# Patient Record
Sex: MALE | Race: WHITE | NOT HISPANIC OR LATINO | URBAN - METROPOLITAN AREA
[De-identification: names, ages, dates, MRNs, and addresses within clinical notes are randomized per-mention and may not be internally consistent; named-entity substitution may affect disease eponyms.]

---

## 2023-07-12 DIAGNOSIS — R97.20 ELEVATED PROSTATE SPECIFIC ANTIGEN (PSA): Primary | ICD-10-CM

## 2023-08-08 ENCOUNTER — OFFICE VISIT (OUTPATIENT)
Dept: UROLOGY | Facility: CLINIC | Age: 73
End: 2023-08-08
Payer: MEDICARE

## 2023-08-08 VITALS
WEIGHT: 141.31 LBS | HEIGHT: 67 IN | SYSTOLIC BLOOD PRESSURE: 123 MMHG | TEMPERATURE: 98 F | HEART RATE: 68 BPM | BODY MASS INDEX: 22.18 KG/M2 | DIASTOLIC BLOOD PRESSURE: 69 MMHG | RESPIRATION RATE: 18 BRPM

## 2023-08-08 DIAGNOSIS — R97.20 ELEVATED PROSTATE SPECIFIC ANTIGEN (PSA): ICD-10-CM

## 2023-08-08 DIAGNOSIS — N40.2 PROSTATE NODULE: Primary | ICD-10-CM

## 2023-08-08 PROCEDURE — 3008F BODY MASS INDEX DOCD: CPT | Mod: CPTII,S$GLB,, | Performed by: UROLOGY

## 2023-08-08 PROCEDURE — 1159F MED LIST DOCD IN RCRD: CPT | Mod: CPTII,S$GLB,, | Performed by: UROLOGY

## 2023-08-08 PROCEDURE — 1101F PT FALLS ASSESS-DOCD LE1/YR: CPT | Mod: CPTII,S$GLB,, | Performed by: UROLOGY

## 2023-08-08 PROCEDURE — 3078F DIAST BP <80 MM HG: CPT | Mod: CPTII,S$GLB,, | Performed by: UROLOGY

## 2023-08-08 PROCEDURE — 3288F PR FALLS RISK ASSESSMENT DOCUMENTED: ICD-10-PCS | Mod: CPTII,S$GLB,, | Performed by: UROLOGY

## 2023-08-08 PROCEDURE — 3078F PR MOST RECENT DIASTOLIC BLOOD PRESSURE < 80 MM HG: ICD-10-PCS | Mod: CPTII,S$GLB,, | Performed by: UROLOGY

## 2023-08-08 PROCEDURE — 1159F PR MEDICATION LIST DOCUMENTED IN MEDICAL RECORD: ICD-10-PCS | Mod: CPTII,S$GLB,, | Performed by: UROLOGY

## 2023-08-08 PROCEDURE — 3008F PR BODY MASS INDEX (BMI) DOCUMENTED: ICD-10-PCS | Mod: CPTII,S$GLB,, | Performed by: UROLOGY

## 2023-08-08 PROCEDURE — 1126F PR PAIN SEVERITY QUANTIFIED, NO PAIN PRESENT: ICD-10-PCS | Mod: CPTII,S$GLB,, | Performed by: UROLOGY

## 2023-08-08 PROCEDURE — 1126F AMNT PAIN NOTED NONE PRSNT: CPT | Mod: CPTII,S$GLB,, | Performed by: UROLOGY

## 2023-08-08 PROCEDURE — 3074F PR MOST RECENT SYSTOLIC BLOOD PRESSURE < 130 MM HG: ICD-10-PCS | Mod: CPTII,S$GLB,, | Performed by: UROLOGY

## 2023-08-08 PROCEDURE — 99204 PR OFFICE/OUTPT VISIT, NEW, LEVL IV, 45-59 MIN: ICD-10-PCS | Mod: S$GLB,,, | Performed by: UROLOGY

## 2023-08-08 PROCEDURE — 3074F SYST BP LT 130 MM HG: CPT | Mod: CPTII,S$GLB,, | Performed by: UROLOGY

## 2023-08-08 PROCEDURE — 99204 OFFICE O/P NEW MOD 45 MIN: CPT | Mod: S$GLB,,, | Performed by: UROLOGY

## 2023-08-08 PROCEDURE — 99999 PR PBB SHADOW E&M-EST. PATIENT-LVL IV: CPT | Mod: PBBFAC,,, | Performed by: UROLOGY

## 2023-08-08 PROCEDURE — 1101F PR PT FALLS ASSESS DOC 0-1 FALLS W/OUT INJ PAST YR: ICD-10-PCS | Mod: CPTII,S$GLB,, | Performed by: UROLOGY

## 2023-08-08 PROCEDURE — 3288F FALL RISK ASSESSMENT DOCD: CPT | Mod: CPTII,S$GLB,, | Performed by: UROLOGY

## 2023-08-08 PROCEDURE — 99999 PR PBB SHADOW E&M-EST. PATIENT-LVL IV: ICD-10-PCS | Mod: PBBFAC,,, | Performed by: UROLOGY

## 2023-08-08 RX ORDER — LEVOFLOXACIN 500 MG/1
TABLET, FILM COATED ORAL
Qty: 1 TABLET | Refills: 0 | Status: SHIPPED | OUTPATIENT
Start: 2023-08-08

## 2023-08-08 RX ORDER — FLUTICASONE FUROATE, UMECLIDINIUM BROMIDE AND VILANTEROL TRIFENATATE 100; 62.5; 25 UG/1; UG/1; UG/1
1 POWDER RESPIRATORY (INHALATION)
COMMUNITY
Start: 2023-05-10

## 2023-08-08 NOTE — PROGRESS NOTES
Chief Complaint   Patient presents with    Other     Elevated PSA       Referring Provider: Dr. Dioni Langley     History of Present Illness:   Surekha Nguyen is a 72 y.o. male here for evaluation of Other (Elevated PSA)  .   23-73yo male, here for evaluation of elevated PSA on 10. on 23 at [facility]. This is up from last year when his PSA was 4.1. He reports that he had previously been treated with finasteride by his dermatologist for hair loss. After reading the side effects, he stopped the finasteride 2 weeks before labs were done. He was on it for over a year. He reports that he has BPH, but doesn't take any meds for it. He does have nocturia x 1. Daytime frequency reflects water intake. No gross hematuria. He does have a recent 12 pound weight loss, but could be related to dental surgeries.   No prior h/o elevated PSA or prostate biopsy.     Review of Systems   Constitutional:  Positive for unexpected weight change. Negative for chills and fever.   Respiratory:  Negative for shortness of breath.    Cardiovascular:  Negative for chest pain.   All other systems reviewed and are negative.        Past Medical History:   Diagnosis Date    COPD (chronic obstructive pulmonary disease)        Past Surgical History:   Procedure Laterality Date    TONSILLECTOMY         Family History   Problem Relation Age of Onset    Hypertension Father     Heart disease Father     Leukemia Brother        Social History     Tobacco Use    Smoking status: Former     Current packs/day: 0.00     Types: Cigarettes     Start date:      Quit date: 1978     Years since quittin.0     Passive exposure: Never    Smokeless tobacco: Never   Substance Use Topics    Alcohol use: Never    Drug use: Never       Current Outpatient Medications   Medication Sig Dispense Refill    levoFLOXacin (LEVAQUIN) 500 MG tablet Take 1 po 1 hour before biopsy 1 tablet 0    TRELEGY ELLIPTA 100-62.5-25 mcg DsDv Inhale 1 puff into  the lungs.       No current facility-administered medications for this visit.       Review of patient's allergies indicates:  No Known Allergies    Physical Exam  Vitals:    08/08/23 1450   BP: 123/69   Pulse: 68   Resp: 18   Temp: 97.7 °F (36.5 °C)       General: Well-developed, well-nourished in no acute distress  HEENT: Normocephalic, atraumatic, Extraocular movements intact  Neck: supple, trachea midline, no cervical or supraclavicular lymphadenopathy  Respirations: even and unlabored  Back: midline spine, no CVA tenderness  Abdomen: soft, Non-tender, non-distended, no organomegaly or palpable masses, no rebound or guarding  Rectal: 8/8/23-40g prostate, firm right side. No gross blood  Extremities: atraumatic, moves all equally, no clubbing, cyanosis or edema  Psych: normal affect  Skin: warm and dry, no lesions  Neuro: Alert and oriented, Cranial nerves II-XII grossly intact        Assessment:   1. Prostate nodule        2. Elevated prostate specific antigen (PSA)  Ambulatory referral/consult to Urology          Plan:  Prostate nodule    Elevated prostate specific antigen (PSA)  -     Ambulatory referral/consult to Urology    Other orders  -     levoFLOXacin (LEVAQUIN) 500 MG tablet; Take 1 po 1 hour before biopsy  Dispense: 1 tablet; Refill: 0        Follow up for TRUS biopsy.                     36

## 2023-08-09 PROBLEM — R97.20 ELEVATED PROSTATE SPECIFIC ANTIGEN (PSA): Status: ACTIVE | Noted: 2023-08-09

## 2023-08-23 ENCOUNTER — PROCEDURE VISIT (OUTPATIENT)
Dept: UROLOGY | Facility: CLINIC | Age: 73
End: 2023-08-23
Payer: MEDICARE

## 2023-08-23 VITALS
HEIGHT: 67 IN | HEART RATE: 72 BPM | BODY MASS INDEX: 21.77 KG/M2 | RESPIRATION RATE: 18 BRPM | WEIGHT: 138.69 LBS | SYSTOLIC BLOOD PRESSURE: 118 MMHG | TEMPERATURE: 98 F | DIASTOLIC BLOOD PRESSURE: 72 MMHG

## 2023-08-23 DIAGNOSIS — N40.2 PROSTATE NODULE: ICD-10-CM

## 2023-08-23 DIAGNOSIS — R97.20 ELEVATED PSA: Primary | ICD-10-CM

## 2023-08-23 PROCEDURE — 88305 TISSUE EXAM BY PATHOLOGIST: CPT | Mod: 26,,, | Performed by: STUDENT IN AN ORGANIZED HEALTH CARE EDUCATION/TRAINING PROGRAM

## 2023-08-23 PROCEDURE — 88305 TISSUE EXAM BY PATHOLOGIST: CPT | Mod: 59 | Performed by: STUDENT IN AN ORGANIZED HEALTH CARE EDUCATION/TRAINING PROGRAM

## 2023-08-23 PROCEDURE — 76872 US TRANSRECTAL: CPT | Mod: 26,S$GLB,, | Performed by: UROLOGY

## 2023-08-23 PROCEDURE — 96372 THER/PROPH/DIAG INJ SC/IM: CPT | Mod: 59,S$GLB,, | Performed by: UROLOGY

## 2023-08-23 PROCEDURE — 76872 PR US TRANSRECTAL: ICD-10-PCS | Mod: 26,S$GLB,, | Performed by: UROLOGY

## 2023-08-23 PROCEDURE — 55700 PR BIOPSY OF PROSTATE,NEEDLE/PUNCH: ICD-10-PCS | Mod: S$GLB,,, | Performed by: UROLOGY

## 2023-08-23 PROCEDURE — 88305 TISSUE EXAM BY PATHOLOGIST: ICD-10-PCS | Mod: 26,,, | Performed by: STUDENT IN AN ORGANIZED HEALTH CARE EDUCATION/TRAINING PROGRAM

## 2023-08-23 PROCEDURE — 55700 PR BIOPSY OF PROSTATE,NEEDLE/PUNCH: CPT | Mod: S$GLB,,, | Performed by: UROLOGY

## 2023-08-23 PROCEDURE — 96372 PR INJECTION,THERAP/PROPH/DIAG2ST, IM OR SUBCUT: ICD-10-PCS | Mod: 59,S$GLB,, | Performed by: UROLOGY

## 2023-08-23 RX ORDER — CEFTRIAXONE 1 G/1
1 INJECTION, POWDER, FOR SOLUTION INTRAMUSCULAR; INTRAVENOUS
Status: COMPLETED | OUTPATIENT
Start: 2023-08-23 | End: 2023-08-23

## 2023-08-23 RX ORDER — LIDOCAINE HYDROCHLORIDE 20 MG/ML
JELLY TOPICAL
Status: COMPLETED | OUTPATIENT
Start: 2023-08-23 | End: 2023-08-23

## 2023-08-23 RX ADMIN — CEFTRIAXONE 1 G: 1 INJECTION, POWDER, FOR SOLUTION INTRAMUSCULAR; INTRAVENOUS at 02:08

## 2023-08-23 RX ADMIN — LIDOCAINE HYDROCHLORIDE: 20 JELLY TOPICAL at 02:08

## 2023-08-23 NOTE — PROCEDURES
Chief Complaint   Patient presents with    Other     Prostate bx       History of Present Illness:   Surekha Nguyen is a 72 y.o. male here for evaluation of Other (Prostate bx)    23-TRUS biopsy today.   23-71yo male, here for evaluation of elevated PSA on 10.19 on 23 at [facility]. This is up from last year when his PSA was 4.1. He reports that he had previously been treated with finasteride by his dermatologist for hair loss. After reading the side effects, he stopped the finasteride 2 weeks before labs were done. He was on it for over a year. He reports that he has BPH, but doesn't take any meds for it. He does have nocturia x 1. Daytime frequency reflects water intake. No gross hematuria. He does have a recent 12 pound weight loss, but could be related to dental surgeries.   No prior h/o elevated PSA or prostate biopsy.     Review of Systems   Constitutional:  Positive for unexpected weight change. Negative for chills and fever.   Respiratory:  Negative for shortness of breath.    Cardiovascular:  Negative for chest pain.   All other systems reviewed and are negative.        Past Medical History:   Diagnosis Date    COPD (chronic obstructive pulmonary disease)        Past Surgical History:   Procedure Laterality Date    TONSILLECTOMY         Family History   Problem Relation Age of Onset    Hypertension Father     Heart disease Father     Leukemia Brother        Social History     Tobacco Use    Smoking status: Former     Current packs/day: 0.00     Types: Cigarettes     Start date:      Quit date: 1978     Years since quittin.0     Passive exposure: Never    Smokeless tobacco: Never   Substance Use Topics    Alcohol use: Never    Drug use: Never       Current Outpatient Medications   Medication Sig Dispense Refill    levoFLOXacin (LEVAQUIN) 500 MG tablet Take 1 po 1 hour before biopsy 1 tablet 0    TRELEGY ELLIPTA 100-62.5-25 mcg DsDv Inhale 1 puff into the lungs.       No  current facility-administered medications for this visit.       Review of patient's allergies indicates:  No Known Allergies    Physical Exam  Vitals:    08/23/23 1403   BP: 118/72   Pulse: 72   Resp: 18   Temp: 97.9 °F (36.6 °C)       General: Well-developed, well-nourished in no acute distress  HEENT: Normocephalic, atraumatic, Extraocular movements intact  Neck: supple, trachea midline, no cervical or supraclavicular lymphadenopathy  Respirations: even and unlabored  Back: midline spine, no CVA tenderness  Abdomen: soft, Non-tender, non-distended, no organomegaly or palpable masses, no rebound or guarding  Rectal: 8/8/23-40g prostate, firm right side. No gross blood  Extremities: atraumatic, moves all equally, no clubbing, cyanosis or edema  Psych: normal affect  Skin: warm and dry, no lesions  Neuro: Alert and oriented, Cranial nerves II-XII grossly intact        Procedure: (1) Transrectal Prostate Biopsy                      (2) Transrectal ultrasound of prostate                     (3) Ultrasound Guidance of Prostate Biopsy needles      Detail: Antibiotic prophylaxis was provided using levaquin and rocephin. After proper consents were obtained, the patient was prepped and draped in normal fashion in the left lateral decubitus position for TRUS/Bx.  5 ml of lidocaine jelly was instilled in the rectum.  Rectal exam noted a nodular prostate on the right side. The U/S with rectal probe was into the patient's rectum.  A spinal needle was used and 10ml of 1% lidocaine was instilled on the side of the prostate at the base of the seminal vesicles. Systematic review was performed of the prostate, noting no lesions on ultrasound. The prostate measured to be 31g. Biopsy was then performed using an 18Ga biopsy needle directed at the base, mid and apex bilaterally for a total of 12 cores. The patient tolerated the procedure well. Estimated blood loss was 2cc.       Assessment:   1. Elevated PSA        2. Prostate nodule             Plan:  Elevated PSA    Prostate nodule    Other orders  -     cefTRIAXone injection 1 g  -     LIDOcaine HCl 2% urojet        Follow up in about 2 weeks (around 9/6/2023).

## 2023-08-28 LAB
COMMENT: NORMAL
FINAL PATHOLOGIC DIAGNOSIS: NORMAL
GROSS: NORMAL
Lab: NORMAL
MICROSCOPIC EXAM: NORMAL

## 2023-09-06 ENCOUNTER — PROCEDURE VISIT (OUTPATIENT)
Dept: UROLOGY | Facility: CLINIC | Age: 73
End: 2023-09-06
Payer: MEDICARE

## 2023-09-06 ENCOUNTER — LAB VISIT (OUTPATIENT)
Dept: LAB | Facility: HOSPITAL | Age: 73
End: 2023-09-06
Attending: UROLOGY
Payer: MEDICARE

## 2023-09-06 VITALS
WEIGHT: 140 LBS | RESPIRATION RATE: 18 BRPM | BODY MASS INDEX: 21.97 KG/M2 | HEART RATE: 70 BPM | HEIGHT: 67 IN | SYSTOLIC BLOOD PRESSURE: 117 MMHG | DIASTOLIC BLOOD PRESSURE: 70 MMHG | TEMPERATURE: 98 F

## 2023-09-06 DIAGNOSIS — C61 PROSTATE CANCER: Primary | ICD-10-CM

## 2023-09-06 DIAGNOSIS — C61 PROSTATE CANCER: ICD-10-CM

## 2023-09-06 LAB
CREAT SERPL-MCNC: 0.8 MG/DL (ref 0.5–1.4)
EST. GFR  (NO RACE VARIABLE): >60 ML/MIN/1.73 M^2

## 2023-09-06 PROCEDURE — 82565 ASSAY OF CREATININE: CPT | Performed by: UROLOGY

## 2023-09-06 PROCEDURE — 99214 PR OFFICE/OUTPT VISIT, EST, LEVL IV, 30-39 MIN: ICD-10-PCS | Mod: S$GLB,,, | Performed by: UROLOGY

## 2023-09-06 PROCEDURE — 36415 COLL VENOUS BLD VENIPUNCTURE: CPT | Mod: PN | Performed by: UROLOGY

## 2023-09-06 PROCEDURE — 99214 OFFICE O/P EST MOD 30 MIN: CPT | Mod: S$GLB,,, | Performed by: UROLOGY

## 2023-09-12 ENCOUNTER — HOSPITAL ENCOUNTER (OUTPATIENT)
Dept: RADIOLOGY | Facility: HOSPITAL | Age: 73
Discharge: HOME | End: 2023-09-12
Attending: UROLOGY
Payer: MEDICARE

## 2023-09-12 DIAGNOSIS — C61 PROSTATE CANCER: ICD-10-CM

## 2023-09-12 PROCEDURE — 78306 NM BONE SCAN WHOLE BODY: ICD-10-PCS | Mod: 26,,, | Performed by: RADIOLOGY

## 2023-09-12 PROCEDURE — 78306 BONE IMAGING WHOLE BODY: CPT | Mod: 26,,, | Performed by: RADIOLOGY

## 2023-09-12 PROCEDURE — 78306 BONE IMAGING WHOLE BODY: CPT | Mod: TC

## 2023-09-24 PROBLEM — C61 PROSTATE CANCER: Status: ACTIVE | Noted: 2023-09-24

## 2023-09-24 NOTE — PROCEDURES
Chief Complaint   Patient presents with    Other     Biopsy results       History of Present Illness:   Surekha Nguyen is a 73 y.o. male here for evaluation of Other (Biopsy results)    23: Pathology shows Elle Grade group 3 prostate cancer, 6/12 samples positive. Pt did well following biopsy. He denies fever. Hematochezia has cleared.   23-TRUS biopsy today.   23-73yo male, here for evaluation of elevated PSA on 10.19 on 23 at [facility]. This is up from last year when his PSA was 4.1. He reports that he had previously been treated with finasteride by his dermatologist for hair loss. After reading the side effects, he stopped the finasteride 2 weeks before labs were done. He was on it for over a year. He reports that he has BPH, but doesn't take any meds for it. He does have nocturia x 1. Daytime frequency reflects water intake. No gross hematuria. He does have a recent 12 pound weight loss, but could be related to dental surgeries.   No prior h/o elevated PSA or prostate biopsy.     Review of Systems   Constitutional:  Positive for unexpected weight change. Negative for chills and fever.   Respiratory:  Negative for shortness of breath.    Cardiovascular:  Negative for chest pain.   All other systems reviewed and are negative.        Past Medical History:   Diagnosis Date    COPD (chronic obstructive pulmonary disease)        Past Surgical History:   Procedure Laterality Date    TONSILLECTOMY         Family History   Problem Relation Age of Onset    Hypertension Father     Heart disease Father     Leukemia Brother        Social History     Tobacco Use    Smoking status: Former     Types: Cigarettes     Start date:      Quit date: 1978     Years since quittin.1     Passive exposure: Never    Smokeless tobacco: Never   Substance Use Topics    Alcohol use: Never    Drug use: Never       Current Outpatient Medications   Medication Sig Dispense Refill    TRELEGY ELLIPTA  100-62.5-25 mcg DsDv Inhale 1 puff into the lungs.      levoFLOXacin (LEVAQUIN) 500 MG tablet Take 1 po 1 hour before biopsy (Patient not taking: Reported on 9/6/2023) 1 tablet 0     No current facility-administered medications for this visit.       Review of patient's allergies indicates:  No Known Allergies    Physical Exam  Vitals:    09/06/23 0924   BP: 117/70   Pulse: 70   Resp: 18   Temp: 97.5 °F (36.4 °C)       General: Well-developed, well-nourished in no acute distress  HEENT: Normocephalic, atraumatic, Extraocular movements intact  Neck: supple, trachea midline, no cervical or supraclavicular lymphadenopathy  Respirations: even and unlabored  Back: midline spine, no CVA tenderness  Abdomen: soft, Non-tender, non-distended, no organomegaly or palpable masses, no rebound or guarding  Rectal: 8/8/23-40g prostate, firm right side. No gross blood  Extremities: atraumatic, moves all equally, no clubbing, cyanosis or edema  Psych: normal affect  Skin: warm and dry, no lesions  Neuro: Alert and oriented, Cranial nerves II-XII grossly intact    Assessment:   1. Prostate cancer  NM Bone Scan Whole Body    MRI Prostate W W/O Contrast    Creatinine, Serum          Plan:  Prostate cancer  -     NM Bone Scan Whole Body; Future; Expected date: 09/06/2023  -     MRI Prostate W W/O Contrast; Future; Expected date: 10/06/2023  -     Creatinine, Serum; Future; Expected date: 09/06/2023      I had a detailed discussion with the patient and his wife today regarding this new diagnosis of prostate cancer. We discussed the natural history of prostate cancer and the [place] [employer] system, as it relates to him. We discussed management options for organ confined prostate cancer. We discussed radical prostatectomy, and the risks and benefits of this option. We discussed open vs robotic surgery. We discussed the risks and expected side effects, including stress urinary incontinence, erectile dysfunction and penile shortening. We  discussed the risk of surgery as it relates to his overall health. We also discussed external beam radiation and the risks/benefits of this option. We discussed new onset irritative voiding symptoms. We discussed the possibility of radiation cystitis and radiation proctitis. We discussed associated erectile dysfunction as well. We also discussed the use of androgen deprivation therapy in this setting. We also discussed brachytherapy and that this would not be recommended, given his abnormal prostate exam. He will undergo further workup and follow up for results.     Follow up for bone scan and MRI results.    I spent a total of 35 minutes on the day of the visit.  This includes face to face time and non-face to face time preparing to see the patient (eg, review of tests), obtaining and/or reviewing separately obtained history, documenting clinical information in the electronic or other health record, independently interpreting results and communicating results to the patient/family/caregiver, or care coordinator.

## 2023-09-27 ENCOUNTER — HOSPITAL ENCOUNTER (OUTPATIENT)
Dept: RADIOLOGY | Facility: HOSPITAL | Age: 73
Discharge: HOME | End: 2023-09-27
Attending: UROLOGY
Payer: MEDICARE

## 2023-09-27 DIAGNOSIS — C61 PROSTATE CANCER: ICD-10-CM

## 2023-09-27 PROCEDURE — 72197 MRI PELVIS W/O & W/DYE: CPT | Mod: 26,,, | Performed by: RADIOLOGY

## 2023-09-27 PROCEDURE — A9585 GADOBUTROL INJECTION: HCPCS | Mod: PN | Performed by: UROLOGY

## 2023-09-27 PROCEDURE — 25500020 PHARM REV CODE 255: Mod: PN | Performed by: UROLOGY

## 2023-09-27 PROCEDURE — 72197 MRI PELVIS W/O & W/DYE: CPT | Mod: TC,PN

## 2023-09-27 PROCEDURE — 72197 MRI PROSTATE W W/O CONTRAST: ICD-10-PCS | Mod: 26,,, | Performed by: RADIOLOGY

## 2023-09-27 RX ORDER — GADOBUTROL 604.72 MG/ML
6.5 INJECTION INTRAVENOUS
Status: COMPLETED | OUTPATIENT
Start: 2023-09-27 | End: 2023-09-27

## 2023-09-27 RX ADMIN — GADOBUTROL 6.5 ML: 604.72 INJECTION INTRAVENOUS at 10:09

## 2023-09-28 ENCOUNTER — TELEPHONE (OUTPATIENT)
Dept: UROLOGY | Facility: CLINIC | Age: 73
End: 2023-09-28
Payer: MEDICARE

## 2023-09-28 NOTE — TELEPHONE ENCOUNTER
Spoke with wife and informed per Dr. Rush that there was no cancer spread on the bone scan or MRI. Wife voiced understanding and stated that they would see Dr. Rush tomorrow in [place].    Eloise Lee LPN

## 2023-09-29 ENCOUNTER — OFFICE VISIT (OUTPATIENT)
Dept: UROLOGY | Facility: CLINIC | Age: 73
End: 2023-09-29
Payer: MEDICARE

## 2023-09-29 VITALS
SYSTOLIC BLOOD PRESSURE: 143 MMHG | BODY MASS INDEX: 22.15 KG/M2 | RESPIRATION RATE: 16 BRPM | HEIGHT: 67 IN | WEIGHT: 141.13 LBS | TEMPERATURE: 98 F | DIASTOLIC BLOOD PRESSURE: 77 MMHG | HEART RATE: 75 BPM

## 2023-09-29 DIAGNOSIS — C61 PROSTATE CANCER: Primary | ICD-10-CM

## 2023-09-29 PROCEDURE — 3078F PR MOST RECENT DIASTOLIC BLOOD PRESSURE < 80 MM HG: ICD-10-PCS | Mod: CPTII,S$GLB,, | Performed by: UROLOGY

## 2023-09-29 PROCEDURE — 99999 PR PBB SHADOW E&M-EST. PATIENT-LVL III: CPT | Mod: PBBFAC,,, | Performed by: UROLOGY

## 2023-09-29 PROCEDURE — 1159F MED LIST DOCD IN RCRD: CPT | Mod: CPTII,S$GLB,, | Performed by: UROLOGY

## 2023-09-29 PROCEDURE — 99214 PR OFFICE/OUTPT VISIT, EST, LEVL IV, 30-39 MIN: ICD-10-PCS | Mod: S$GLB,,, | Performed by: UROLOGY

## 2023-09-29 PROCEDURE — 3288F PR FALLS RISK ASSESSMENT DOCUMENTED: ICD-10-PCS | Mod: CPTII,S$GLB,, | Performed by: UROLOGY

## 2023-09-29 PROCEDURE — 1101F PT FALLS ASSESS-DOCD LE1/YR: CPT | Mod: CPTII,S$GLB,, | Performed by: UROLOGY

## 2023-09-29 PROCEDURE — 99214 OFFICE O/P EST MOD 30 MIN: CPT | Mod: S$GLB,,, | Performed by: UROLOGY

## 2023-09-29 PROCEDURE — 1126F PR PAIN SEVERITY QUANTIFIED, NO PAIN PRESENT: ICD-10-PCS | Mod: CPTII,S$GLB,, | Performed by: UROLOGY

## 2023-09-29 PROCEDURE — 3077F PR MOST RECENT SYSTOLIC BLOOD PRESSURE >= 140 MM HG: ICD-10-PCS | Mod: CPTII,S$GLB,, | Performed by: UROLOGY

## 2023-09-29 PROCEDURE — 99999 PR PBB SHADOW E&M-EST. PATIENT-LVL III: ICD-10-PCS | Mod: PBBFAC,,, | Performed by: UROLOGY

## 2023-09-29 PROCEDURE — 1101F PR PT FALLS ASSESS DOC 0-1 FALLS W/OUT INJ PAST YR: ICD-10-PCS | Mod: CPTII,S$GLB,, | Performed by: UROLOGY

## 2023-09-29 PROCEDURE — 1159F PR MEDICATION LIST DOCUMENTED IN MEDICAL RECORD: ICD-10-PCS | Mod: CPTII,S$GLB,, | Performed by: UROLOGY

## 2023-09-29 PROCEDURE — 1126F AMNT PAIN NOTED NONE PRSNT: CPT | Mod: CPTII,S$GLB,, | Performed by: UROLOGY

## 2023-09-29 PROCEDURE — 3077F SYST BP >= 140 MM HG: CPT | Mod: CPTII,S$GLB,, | Performed by: UROLOGY

## 2023-09-29 PROCEDURE — 3288F FALL RISK ASSESSMENT DOCD: CPT | Mod: CPTII,S$GLB,, | Performed by: UROLOGY

## 2023-09-29 PROCEDURE — 3078F DIAST BP <80 MM HG: CPT | Mod: CPTII,S$GLB,, | Performed by: UROLOGY

## 2023-09-29 NOTE — PROGRESS NOTES
Chief Complaint   Patient presents with    Prostate Cancer       History of Present Illness:   Surekha Nguyen is a 73 y.o. male here for evaluation of Prostate Cancer    23-Bone scan negative for osseous metastatic disease. MRI shows large right sided malignancy. No extraprostatic extension or pelvic lymphadenopathy.   23: Pathology shows Jay Grade group 3 prostate cancer, 6/12 samples positive. Pt did well following biopsy. He denies fever. Hematochezia has cleared.   23-TRUS biopsy today.   23-73yo male, here for evaluation of elevated PSA on 10.19 on 23 at [facility] This is up from last year when his PSA was 4.1. He reports that he had previously been treated with finasteride by his dermatologist for hair loss. After reading the side effects, he stopped the finasteride 2 weeks before labs were done. He was on it for over a year. He reports that he has BPH, but doesn't take any meds for it. He does have nocturia x 1. Daytime frequency reflects water intake. No gross hematuria. He does have a recent 12 pound weight loss, but could be related to dental surgeries.   No prior h/o elevated PSA or prostate biopsy.     Review of Systems   Constitutional:  Positive for unexpected weight change. Negative for chills and fever.   Respiratory:  Negative for shortness of breath.    Cardiovascular:  Negative for chest pain.   All other systems reviewed and are negative.        Past Medical History:   Diagnosis Date    COPD (chronic obstructive pulmonary disease)        Past Surgical History:   Procedure Laterality Date    TONSILLECTOMY         Family History   Problem Relation Age of Onset    Hypertension Father     Heart disease Father     Leukemia Brother        Social History     Tobacco Use    Smoking status: Former     Types: Cigarettes     Start date:      Quit date: 1978     Years since quittin.1     Passive exposure: Never    Smokeless tobacco: Never   Substance Use  Topics    Alcohol use: Never    Drug use: Never       Current Outpatient Medications   Medication Sig Dispense Refill    TRELEGY ELLIPTA 100-62.5-25 mcg DsDv Inhale 1 puff into the lungs.      levoFLOXacin (LEVAQUIN) 500 MG tablet Take 1 po 1 hour before biopsy (Patient not taking: Reported on 9/6/2023) 1 tablet 0     No current facility-administered medications for this visit.       Review of patient's allergies indicates:  No Known Allergies    Physical Exam  Vitals:    09/29/23 0939   BP: (!) 143/77   Pulse: 75   Resp: 16   Temp: 97.7 °F (36.5 °C)         General: Well-developed, well-nourished in no acute distress  HEENT: Normocephalic, atraumatic, Extraocular movements intact  Neck: supple, trachea midline, no cervical or supraclavicular lymphadenopathy  Respirations: even and unlabored  Rectal: 8/8/23-40g prostate, firm right side. No gross blood  Extremities: atraumatic, moves all equally, no clubbing, cyanosis or edema  Psych: normal affect  Skin: warm and dry, no lesions  Neuro: Alert and oriented, Cranial nerves II-XII grossly intact    Assessment:   1. Prostate cancer  Prior authorization Order    Ambulatory referral/consult to Radiation Oncology            Plan:  Prostate cancer  -     Prior authorization Order  -     Ambulatory referral/consult to Radiation Oncology; Future; Expected date: 10/06/2023        I had a detailed discussion with the patient today regarding his imaging studies. He is interested in pursuing radiation. We discussed external beam radiation and the risks/benefits of this option. We discussed new onset irritative voiding symptoms. We discussed the possibility of radiation cystitis and radiation proctitis. We discussed associated erectile dysfunction as well. We also discussed the use of androgen deprivation therapy in this setting. He would like to pursue this. Will refer to Micaela Reynolds. Pt to bring MRI and bone scan on disk with him. Follow up next week for Lupron.     I  spent a total of 35 minutes on the day of the visit.  This includes face to face time and non-face to face time preparing to see the patient (eg, review of tests), obtaining and/or reviewing separately obtained history, documenting clinical information in the electronic or other health record, independently interpreting results and communicating results to the patient/family/caregiver, or care coordinator.

## 2023-10-10 ENCOUNTER — CLINICAL SUPPORT (OUTPATIENT)
Dept: UROLOGY | Facility: CLINIC | Age: 73
End: 2023-10-10
Payer: MEDICARE

## 2023-10-10 VITALS — WEIGHT: 141 LBS | BODY MASS INDEX: 22.13 KG/M2 | HEIGHT: 67 IN | RESPIRATION RATE: 14 BRPM

## 2023-10-10 DIAGNOSIS — C61 PROSTATE CANCER: Primary | ICD-10-CM

## 2023-10-10 PROCEDURE — 96402 CHEMO HORMON ANTINEOPL SQ/IM: CPT | Mod: S$GLB,,, | Performed by: UROLOGY

## 2023-10-10 PROCEDURE — 96402 PR CHEMOTHER HORMON ANTINEOPL SUB-Q/IM: ICD-10-PCS | Mod: S$GLB,,, | Performed by: UROLOGY

## 2023-10-10 PROCEDURE — 99999 PR PBB SHADOW E&M-EST. PATIENT-LVL II: CPT | Mod: PBBFAC,,,

## 2023-10-10 PROCEDURE — 99999 PR PBB SHADOW E&M-EST. PATIENT-LVL II: ICD-10-PCS | Mod: PBBFAC,,,

## 2023-10-10 NOTE — PROGRESS NOTES
....Using aseptic technique, Lupron 22.5 mg adm IM to right ventrogluteal as per written order of Dr. Rush.  Patient was asked and agreed to remain in clinic for 15 minutes and to report any adverse reaction to staff.

## 2024-01-30 ENCOUNTER — LAB VISIT (OUTPATIENT)
Dept: LAB | Facility: HOSPITAL | Age: 74
End: 2024-01-30
Attending: UROLOGY
Payer: MEDICARE

## 2024-01-30 ENCOUNTER — OFFICE VISIT (OUTPATIENT)
Dept: UROLOGY | Facility: CLINIC | Age: 74
End: 2024-01-30
Payer: MEDICARE

## 2024-01-30 VITALS
HEART RATE: 77 BPM | SYSTOLIC BLOOD PRESSURE: 153 MMHG | WEIGHT: 142 LBS | RESPIRATION RATE: 18 BRPM | BODY MASS INDEX: 22.29 KG/M2 | HEIGHT: 67 IN | DIASTOLIC BLOOD PRESSURE: 75 MMHG

## 2024-01-30 DIAGNOSIS — C61 PROSTATE CANCER: Primary | ICD-10-CM

## 2024-01-30 DIAGNOSIS — C61 PROSTATE CANCER: ICD-10-CM

## 2024-01-30 LAB — COMPLEXED PSA SERPL-MCNC: 0.02 NG/ML (ref 0–4)

## 2024-01-30 PROCEDURE — 84153 ASSAY OF PSA TOTAL: CPT | Performed by: UROLOGY

## 2024-01-30 PROCEDURE — 99499 UNLISTED E&M SERVICE: CPT | Mod: S$GLB,,, | Performed by: UROLOGY

## 2024-01-30 PROCEDURE — 96402 CHEMO HORMON ANTINEOPL SQ/IM: CPT | Mod: S$GLB,,, | Performed by: UROLOGY

## 2024-01-30 PROCEDURE — 36415 COLL VENOUS BLD VENIPUNCTURE: CPT | Mod: PN | Performed by: UROLOGY

## 2024-01-30 PROCEDURE — 99999 PR PBB SHADOW E&M-EST. PATIENT-LVL III: CPT | Mod: PBBFAC,,, | Performed by: UROLOGY

## 2024-01-30 NOTE — PROGRESS NOTES
Patient came in for his scheduled visit with Dr. Rush and was ordered Lupron 30 mg injection, Lupron 30 mg injection was administered IM to right ventrogluteal using aseptic technique. Pt tolerated procedure well. Patient agreed to wait 15 minutes after injection in the clinic and to report any adverse reactions.       Gregorio Suarez RN

## 2024-01-30 NOTE — PROGRESS NOTES
Chief Complaint   Patient presents with    Other     4 month f/u        History of Present Illness:   Surekha Nguyen is a 73 y.o. male here for evaluation of Other (4 month f/u )    1/30/24- Completed XRT 12/7/23. Had 3 months on Lupron thus far. Currently undergoing radiation on his face for basal cell carcinoma. He was having frequency, but his LUTS are going back to baseline. He denies any gross hematuria since the biopsy. He had some dysuria, also resolved.   9/29/23-Bone scan negative for osseous metastatic disease. MRI shows large right sided malignancy. No extraprostatic extension or pelvic lymphadenopathy.   9/6/23: Pathology shows Elle Grade group 3 prostate cancer, 6/12 samples positive. Pt did well following biopsy. He denies fever. Hematochezia has cleared.   8/23/23-TRUS biopsy today.   6/8/[age]yo male, here for evaluation of elevated PSA on 10.19 on 6/20/23 at [facility]. This is up from last year when his PSA was 4.1. He reports that he had previously been treated with finasteride by his dermatologist for hair loss. After reading the side effects, he stopped the finasteride 2 weeks before labs were done. He was on it for over a year. He reports that he has BPH, but doesn't take any meds for it. He does have nocturia x 1. Daytime frequency reflects water intake. No gross hematuria. He does have a recent 12 pound weight loss, but could be related to dental surgeries.   No prior h/o elevated PSA or prostate biopsy.     Review of Systems   Constitutional:  Negative for chills and fever.   HENT:  Positive for dental problem.    Respiratory:  Negative for shortness of breath.    Cardiovascular:  Negative for chest pain.   Gastrointestinal:  Positive for constipation. Negative for abdominal pain.   Musculoskeletal:  Negative for back pain.   All other systems reviewed and are negative.        Past Medical History:   Diagnosis Date    COPD (chronic obstructive pulmonary disease)        Past  Surgical History:   Procedure Laterality Date    TONSILLECTOMY         Family History   Problem Relation Age of Onset    Hypertension Father     Heart disease Father     Leukemia Brother        Social History     Tobacco Use    Smoking status: Former     Types: Cigarettes     Start date:      Quit date: 1978     Years since quittin.5     Passive exposure: Never    Smokeless tobacco: Never   Substance Use Topics    Alcohol use: Never    Drug use: Never       Current Outpatient Medications   Medication Sig Dispense Refill    TRELEGY ELLIPTA 100-62.5-25 mcg DsDv Inhale 1 puff into the lungs.      levoFLOXacin (LEVAQUIN) 500 MG tablet Take 1 po 1 hour before biopsy (Patient not taking: Reported on 2023) 1 tablet 0     No current facility-administered medications for this visit.       Review of patient's allergies indicates:  No Known Allergies    Physical Exam  Vitals:    24 0822   BP: (!) 153/75   Pulse: 77   Resp: 18           General: Well-developed, well-nourished in no acute distress  HEENT: Normocephalic, atraumatic, Extraocular movements intact  Neck: supple, trachea midline, no cervical or supraclavicular lymphadenopathy  Respirations: even and unlabored  Rectal: 23-40g prostate, firm right side. No gross blood  Extremities: atraumatic, moves all equally, no clubbing, cyanosis or edema  Psych: normal affect  Skin: warm and dry, no lesions  Neuro: Alert and oriented, Cranial nerves II-XII grossly intact    UA: blood trace, otherwise negative    Assessment:   1. Prostate cancer  Prostate Specific Antigen, Diagnostic    Prostate Specific Antigen, Diagnostic    leuprolide (LUPRON) injection 30 mg    DISCONTINUED: leuprolide (LUPRON) injection 22.5 mg              Plan:  Prostate cancer  -     Discontinue: leuprolide (LUPRON) injection 22.5 mg  -     Prostate Specific Antigen, Diagnostic; Future; Expected date: 2024  -     Prostate Specific Antigen, Diagnostic; Future; Expected date:  07/30/2024  -     leuprolide (LUPRON) injection 30 mg      Follow up in about 6 months (around 7/30/2024).

## 2024-01-31 ENCOUNTER — TELEPHONE (OUTPATIENT)
Dept: UROLOGY | Facility: CLINIC | Age: 74
End: 2024-01-31
Payer: MEDICARE

## 2024-01-31 NOTE — TELEPHONE ENCOUNTER
Called patient and confirmed name and date of birth. Informed patient of results patient voiced understanding               ----- Message from Gracy Rush MD sent at 1/31/2024  1:50 PM CST -----  Contact: BAMBI REECE [[id number]]  PSA looks good. 0.02.   ----- Message -----  From: Marija Ruff MA  Sent: 1/31/2024  11:20 AM CST  To: Gracy Rush MD    Can you please review PSA.  ----- Message -----  From: Susana Martínez  Sent: 1/31/2024  10:02 AM CST  To: Victor Manuel [facility] Staff    ..Type:  Patient Requesting Call    Who Called: BAMBI REECE [[id number]]  Does the patient know what this is regarding?: discuss results  Would the patient rather a call back or a response via [facility]? call  Best Call Back Number: .[phone] (home)   Additional Information:

## 2024-07-24 ENCOUNTER — LAB VISIT (OUTPATIENT)
Dept: LAB | Facility: HOSPITAL | Age: 74
End: 2024-07-24
Attending: UROLOGY
Payer: MEDICARE

## 2024-07-24 DIAGNOSIS — C61 PROSTATE CANCER: ICD-10-CM

## 2024-07-24 LAB — COMPLEXED PSA SERPL-MCNC: 0.09 NG/ML (ref 0–4)

## 2024-07-24 PROCEDURE — 36415 COLL VENOUS BLD VENIPUNCTURE: CPT | Mod: PN | Performed by: UROLOGY

## 2024-07-24 PROCEDURE — 84153 ASSAY OF PSA TOTAL: CPT | Performed by: UROLOGY

## 2024-07-31 ENCOUNTER — OFFICE VISIT (OUTPATIENT)
Dept: UROLOGY | Facility: CLINIC | Age: 74
End: 2024-07-31
Payer: MEDICARE

## 2024-07-31 VITALS
DIASTOLIC BLOOD PRESSURE: 75 MMHG | WEIGHT: 147.94 LBS | HEART RATE: 72 BPM | HEIGHT: 67 IN | SYSTOLIC BLOOD PRESSURE: 133 MMHG | BODY MASS INDEX: 23.22 KG/M2 | RESPIRATION RATE: 18 BRPM

## 2024-07-31 DIAGNOSIS — C61 PROSTATE CANCER: Primary | ICD-10-CM

## 2024-07-31 DIAGNOSIS — R97.20 ELEVATED PSA: ICD-10-CM

## 2024-07-31 LAB
BILIRUB UR QL STRIP: NEGATIVE
GLUCOSE UR QL STRIP: NEGATIVE
KETONES UR QL STRIP: NEGATIVE
LEUKOCYTE ESTERASE UR QL STRIP: NEGATIVE
PH, POC UA: 6.5
POC BLOOD, URINE: NEGATIVE
POC NITRATES, URINE: NEGATIVE
PROT UR QL STRIP: NEGATIVE
SP GR UR STRIP: 1.02 (ref 1–1.03)
UROBILINOGEN UR STRIP-ACNC: 0.2 (ref 0.3–2.2)

## 2024-07-31 PROCEDURE — 99213 OFFICE O/P EST LOW 20 MIN: CPT | Mod: S$GLB,,, | Performed by: UROLOGY

## 2024-07-31 PROCEDURE — 3078F DIAST BP <80 MM HG: CPT | Mod: CPTII,S$GLB,, | Performed by: UROLOGY

## 2024-07-31 PROCEDURE — 1126F AMNT PAIN NOTED NONE PRSNT: CPT | Mod: CPTII,S$GLB,, | Performed by: UROLOGY

## 2024-07-31 PROCEDURE — 1159F MED LIST DOCD IN RCRD: CPT | Mod: CPTII,S$GLB,, | Performed by: UROLOGY

## 2024-07-31 PROCEDURE — 3075F SYST BP GE 130 - 139MM HG: CPT | Mod: CPTII,S$GLB,, | Performed by: UROLOGY

## 2024-07-31 PROCEDURE — 3288F FALL RISK ASSESSMENT DOCD: CPT | Mod: CPTII,S$GLB,, | Performed by: UROLOGY

## 2024-07-31 PROCEDURE — 81003 URINALYSIS AUTO W/O SCOPE: CPT | Mod: QW,S$GLB,, | Performed by: UROLOGY

## 2024-07-31 PROCEDURE — 1101F PT FALLS ASSESS-DOCD LE1/YR: CPT | Mod: CPTII,S$GLB,, | Performed by: UROLOGY

## 2024-07-31 PROCEDURE — 99999 PR PBB SHADOW E&M-EST. PATIENT-LVL III: CPT | Mod: PBBFAC,,, | Performed by: UROLOGY

## 2025-01-31 ENCOUNTER — LAB VISIT (OUTPATIENT)
Dept: LAB | Facility: HOSPITAL | Age: 75
End: 2025-01-31
Attending: UROLOGY
Payer: MEDICARE

## 2025-01-31 DIAGNOSIS — C61 PROSTATE CANCER: ICD-10-CM

## 2025-01-31 LAB — COMPLEXED PSA SERPL-MCNC: 0.15 NG/ML (ref 0–4)

## 2025-01-31 PROCEDURE — 36415 COLL VENOUS BLD VENIPUNCTURE: CPT | Mod: PN | Performed by: UROLOGY

## 2025-01-31 PROCEDURE — 84153 ASSAY OF PSA TOTAL: CPT | Performed by: UROLOGY

## 2025-02-05 ENCOUNTER — OFFICE VISIT (OUTPATIENT)
Dept: UROLOGY | Facility: CLINIC | Age: 75
End: 2025-02-05
Payer: MEDICARE

## 2025-02-05 VITALS
HEART RATE: 72 BPM | DIASTOLIC BLOOD PRESSURE: 76 MMHG | SYSTOLIC BLOOD PRESSURE: 142 MMHG | WEIGHT: 155.19 LBS | BODY MASS INDEX: 24.36 KG/M2 | HEIGHT: 67 IN | RESPIRATION RATE: 18 BRPM

## 2025-02-05 DIAGNOSIS — C61 PROSTATE CANCER: Primary | ICD-10-CM

## 2025-02-05 LAB
BILIRUB UR QL STRIP: NEGATIVE
GLUCOSE UR QL STRIP: NEGATIVE
KETONES UR QL STRIP: NEGATIVE
LEUKOCYTE ESTERASE UR QL STRIP: NEGATIVE
PH, POC UA: 5.5
POC BLOOD, URINE: NEGATIVE
POC NITRATES, URINE: NEGATIVE
PROT UR QL STRIP: NEGATIVE
SP GR UR STRIP: 1.01 (ref 1–1.03)
UROBILINOGEN UR STRIP-ACNC: 0.2 (ref 0.3–2.2)

## 2025-02-05 PROCEDURE — 1101F PT FALLS ASSESS-DOCD LE1/YR: CPT | Mod: CPTII,S$GLB,, | Performed by: UROLOGY

## 2025-02-05 PROCEDURE — 1126F AMNT PAIN NOTED NONE PRSNT: CPT | Mod: CPTII,S$GLB,, | Performed by: UROLOGY

## 2025-02-05 PROCEDURE — 3078F DIAST BP <80 MM HG: CPT | Mod: CPTII,S$GLB,, | Performed by: UROLOGY

## 2025-02-05 PROCEDURE — 1160F RVW MEDS BY RX/DR IN RCRD: CPT | Mod: CPTII,S$GLB,, | Performed by: UROLOGY

## 2025-02-05 PROCEDURE — 99213 OFFICE O/P EST LOW 20 MIN: CPT | Mod: S$GLB,,, | Performed by: UROLOGY

## 2025-02-05 PROCEDURE — 99999 PR PBB SHADOW E&M-EST. PATIENT-LVL III: CPT | Mod: PBBFAC,,, | Performed by: UROLOGY

## 2025-02-05 PROCEDURE — 1159F MED LIST DOCD IN RCRD: CPT | Mod: CPTII,S$GLB,, | Performed by: UROLOGY

## 2025-02-05 PROCEDURE — 3288F FALL RISK ASSESSMENT DOCD: CPT | Mod: CPTII,S$GLB,, | Performed by: UROLOGY

## 2025-02-05 PROCEDURE — 3077F SYST BP >= 140 MM HG: CPT | Mod: CPTII,S$GLB,, | Performed by: UROLOGY

## 2025-02-05 PROCEDURE — 81003 URINALYSIS AUTO W/O SCOPE: CPT | Mod: QW,S$GLB,, | Performed by: UROLOGY

## 2025-02-05 NOTE — PROGRESS NOTES
Chief Complaint   Patient presents with    Prostate Cancer     6 month PSA review        History of Present Illness:   Surekha Nguyen is a 74 y.o. male here for evaluation of Prostate Cancer (6 month PSA review )    2/5/25-Feeling well. Good urinary stream. He does have some urgency. No incontinence. Feels like he empties. No dysuria or gross hematuria.     7/31/24-feels well. Gaining some weight back that he lost due to dental issues. Having some frequency, but not bothersome. Good dysuria or gross hematuria. Good stream. Fatigue has improved.     1/30/24- Completed XRT 12/7/23. Had 3 months on Lupron thus far. Currently undergoing radiation on his face for basal cell carcinoma. He was having frequency, but his LUTS are going back to baseline. He denies any gross hematuria since the biopsy. He had some dysuria, also resolved.   9/29/23-Bone scan negative for osseous metastatic disease. MRI shows large right sided malignancy. No extraprostatic extension or pelvic lymphadenopathy.   9/6/23: Pathology shows [place] Grade group 3 prostate cancer, 6/12 samples positive. Pt did well following biopsy. He denies fever. Hematochezia has cleared.   8/23/23-TRUS biopsy today.   8/8/23-71yo male, here for evaluation of elevated PSA on 10.19 on 6/20/23 at [facility]. This is up from last year when his PSA was 4.1. He reports that he had previously been treated with finasteride by his dermatologist for hair loss. After reading the side effects, he stopped the finasteride 2 weeks before labs were done. He was on it for over a year. He reports that he has BPH, but doesn't take any meds for it. He does have nocturia x 1. Daytime frequency reflects water intake. No gross hematuria. He does have a recent 12 pound weight loss, but could be related to dental surgeries.   No prior h/o elevated PSA or prostate biopsy.     Review of Systems   Constitutional:  Negative for chills and fever.   Respiratory:  Negative for shortness  of breath.    Cardiovascular:  Negative for chest pain.   Gastrointestinal:  Negative for abdominal pain, constipation, diarrhea and rectal pain.   Musculoskeletal:  Negative for back pain.   All other systems reviewed and are negative.        Past Medical History:   Diagnosis Date    COPD (chronic obstructive pulmonary disease)        Past Surgical History:   Procedure Laterality Date    TONSILLECTOMY         Family History   Problem Relation Name Age of Onset    Hypertension Father      Heart disease Father      Leukemia Brother         Social History     Tobacco Use    Smoking status: Former     Types: Cigarettes     Start date:      Quit date: 1978     Years since quittin.5     Passive exposure: Never    Smokeless tobacco: Never   Substance Use Topics    Alcohol use: Never    Drug use: Never       Current Outpatient Medications   Medication Sig Dispense Refill    TRELEGY ELLIPTA 100-62.5-25 mcg DsDv Inhale 1 puff into the lungs.       No current facility-administered medications for this visit.       Review of patient's allergies indicates:  No Known Allergies    Physical Exam  Vitals:    25 0832   BP: (!) 142/76   Pulse: 72   Resp: 18       General: Well-developed, well-nourished in no acute distress  HEENT: Normocephalic, atraumatic, Extraocular movements intact  Neck: supple, trachea midline, no cervical or supraclavicular lymphadenopathy  Respirations: even and unlabored  Abdomen: soft, NTND, no masses or tenderness  Extremities: atraumatic, moves all equally, no clubbing, cyanosis or edema  Psych: normal affect  Skin: warm and dry, no lesions  Neuro: Alert and oriented, Cranial nerves II-XII grossly intact    UA: negative for blood, LE, nit    PSA:   24: 0.02 (last 4 month Lupron given on this day)  24: 0.09  25: 0.15    Assessment:   1. Prostate cancer  POCT Urinalysis, Dipstick, Automated, W/O Scope    PROSTATE SPECIFIC ANTIGEN, DIAGNOSTIC              Plan:  Prostate  cancer  -     POCT Urinalysis, Dipstick, Automated, W/O Scope  -     PROSTATE SPECIFIC ANTIGEN, DIAGNOSTIC; Future; Expected date: 08/05/2025      Follow up in about 6 months (around 8/5/2025) for labs before visit.

## 2025-04-15 ENCOUNTER — PATIENT MESSAGE (OUTPATIENT)
Dept: NEUROLOGY | Facility: CLINIC | Age: 75
End: 2025-04-15
Payer: MEDICARE

## 2025-08-05 ENCOUNTER — LAB VISIT (OUTPATIENT)
Dept: LAB | Facility: HOSPITAL | Age: 75
End: 2025-08-05
Attending: UROLOGY
Payer: MEDICARE

## 2025-08-05 DIAGNOSIS — C61 PROSTATE CANCER: ICD-10-CM

## 2025-08-05 LAB — PSA SERPL-MCNC: 0.28 NG/ML

## 2025-08-05 PROCEDURE — 36415 COLL VENOUS BLD VENIPUNCTURE: CPT | Mod: PN

## 2025-08-05 PROCEDURE — 84153 ASSAY OF PSA TOTAL: CPT

## 2025-08-13 ENCOUNTER — OFFICE VISIT (OUTPATIENT)
Dept: UROLOGY | Facility: CLINIC | Age: 75
End: 2025-08-13
Payer: MEDICARE

## 2025-08-13 VITALS
HEART RATE: 73 BPM | SYSTOLIC BLOOD PRESSURE: 134 MMHG | HEIGHT: 67 IN | BODY MASS INDEX: 24.22 KG/M2 | TEMPERATURE: 98 F | WEIGHT: 154.31 LBS | RESPIRATION RATE: 17 BRPM | DIASTOLIC BLOOD PRESSURE: 75 MMHG

## 2025-08-13 DIAGNOSIS — C61 PROSTATE CANCER: Primary | ICD-10-CM

## 2025-08-13 LAB
BILIRUBIN, UA POC OHS: NEGATIVE
BLOOD, UA POC OHS: NEGATIVE
CLARITY, UA POC OHS: CLEAR
COLOR, UA POC OHS: YELLOW
GLUCOSE, UA POC OHS: NEGATIVE
KETONES, UA POC OHS: NEGATIVE
LEUKOCYTES, UA POC OHS: NEGATIVE
NITRITE, UA POC OHS: NEGATIVE
PH, UA POC OHS: 5.5
POC RESIDUAL URINE VOLUME: 12 ML (ref 0–100)
PROTEIN, UA POC OHS: NEGATIVE
SPECIFIC GRAVITY, UA POC OHS: <=1.005
UROBILINOGEN, UA POC OHS: 0.2

## 2025-08-13 PROCEDURE — 99213 OFFICE O/P EST LOW 20 MIN: CPT | Mod: S$GLB,,, | Performed by: UROLOGY

## 2025-08-13 PROCEDURE — 3288F FALL RISK ASSESSMENT DOCD: CPT | Mod: CPTII,S$GLB,, | Performed by: UROLOGY

## 2025-08-13 PROCEDURE — 1126F AMNT PAIN NOTED NONE PRSNT: CPT | Mod: CPTII,S$GLB,, | Performed by: UROLOGY

## 2025-08-13 PROCEDURE — 3075F SYST BP GE 130 - 139MM HG: CPT | Mod: CPTII,S$GLB,, | Performed by: UROLOGY

## 2025-08-13 PROCEDURE — 81003 URINALYSIS AUTO W/O SCOPE: CPT | Mod: QW,S$GLB,, | Performed by: UROLOGY

## 2025-08-13 PROCEDURE — 3078F DIAST BP <80 MM HG: CPT | Mod: CPTII,S$GLB,, | Performed by: UROLOGY

## 2025-08-13 PROCEDURE — 51798 US URINE CAPACITY MEASURE: CPT | Mod: S$GLB,,, | Performed by: UROLOGY

## 2025-08-13 PROCEDURE — 1101F PT FALLS ASSESS-DOCD LE1/YR: CPT | Mod: CPTII,S$GLB,, | Performed by: UROLOGY

## 2025-08-13 PROCEDURE — 1159F MED LIST DOCD IN RCRD: CPT | Mod: CPTII,S$GLB,, | Performed by: UROLOGY

## 2025-08-13 PROCEDURE — 99999 PR PBB SHADOW E&M-EST. PATIENT-LVL III: CPT | Mod: PBBFAC,,, | Performed by: UROLOGY
